# Patient Record
Sex: MALE | Race: BLACK OR AFRICAN AMERICAN | Employment: UNEMPLOYED | URBAN - METROPOLITAN AREA
[De-identification: names, ages, dates, MRNs, and addresses within clinical notes are randomized per-mention and may not be internally consistent; named-entity substitution may affect disease eponyms.]

---
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
## 2024-05-18 ENCOUNTER — HOSPITAL ENCOUNTER (EMERGENCY)
Facility: HOSPITAL | Age: 19
Discharge: HOME | End: 2024-05-18
Payer: COMMERCIAL

## 2024-05-18 VITALS
HEART RATE: 59 BPM | WEIGHT: 152.2 LBS | TEMPERATURE: 98.3 F | OXYGEN SATURATION: 98 % | RESPIRATION RATE: 19 BRPM | HEIGHT: 70 IN | DIASTOLIC BLOOD PRESSURE: 75 MMHG | BODY MASS INDEX: 21.79 KG/M2 | SYSTOLIC BLOOD PRESSURE: 112 MMHG

## 2024-05-18 DIAGNOSIS — Z20.2 POSSIBLE EXPOSURE TO STD: Primary | ICD-10-CM

## 2024-05-18 LAB
APPEARANCE UR: ABNORMAL
BACTERIA URNS QL MICRO: NEGATIVE /HPF
BILIRUB UR QL: NEGATIVE
COLOR UR: ABNORMAL
EPITH CASTS URNS QL MICRO: ABNORMAL /LPF
GLUCOSE UR STRIP.AUTO-MCNC: NEGATIVE MG/DL
HGB UR QL STRIP: ABNORMAL
KETONES UR QL STRIP.AUTO: ABNORMAL MG/DL
LEUKOCYTE ESTERASE UR QL STRIP.AUTO: ABNORMAL
NITRITE UR QL STRIP.AUTO: NEGATIVE
PH UR STRIP: 6 (ref 5–8)
PROT UR STRIP-MCNC: 30 MG/DL
RBC #/AREA URNS HPF: ABNORMAL /HPF (ref 0–5)
SP GR UR REFRACTOMETRY: 1.02
URINE CULTURE IF INDICATED: ABNORMAL
UROBILINOGEN UR QL STRIP.AUTO: 4 EU/DL (ref 0.2–1)
WBC URNS QL MICRO: ABNORMAL /HPF (ref 0–4)

## 2024-05-18 PROCEDURE — 87086 URINE CULTURE/COLONY COUNT: CPT

## 2024-05-18 PROCEDURE — 96372 THER/PROPH/DIAG INJ SC/IM: CPT

## 2024-05-18 PROCEDURE — 99284 EMERGENCY DEPT VISIT MOD MDM: CPT

## 2024-05-18 PROCEDURE — 81001 URINALYSIS AUTO W/SCOPE: CPT

## 2024-05-18 PROCEDURE — 6370000000 HC RX 637 (ALT 250 FOR IP): Performed by: NURSE PRACTITIONER

## 2024-05-18 PROCEDURE — 87491 CHLMYD TRACH DNA AMP PROBE: CPT

## 2024-05-18 PROCEDURE — 6360000002 HC RX W HCPCS: Performed by: NURSE PRACTITIONER

## 2024-05-18 PROCEDURE — 2500000003 HC RX 250 WO HCPCS: Performed by: NURSE PRACTITIONER

## 2024-05-18 PROCEDURE — 87591 N.GONORRHOEAE DNA AMP PROB: CPT

## 2024-05-18 RX ORDER — AZITHROMYCIN 500 MG/1
1000 TABLET, FILM COATED ORAL
Status: COMPLETED | OUTPATIENT
Start: 2024-05-18 | End: 2024-05-18

## 2024-05-18 RX ADMIN — AZITHROMYCIN 1000 MG: 500 TABLET, FILM COATED ORAL at 13:12

## 2024-05-18 RX ADMIN — LIDOCAINE HYDROCHLORIDE 500 MG: 10 INJECTION, SOLUTION EPIDURAL; INFILTRATION; INTRACAUDAL; PERINEURAL at 13:11

## 2024-05-18 ASSESSMENT — ENCOUNTER SYMPTOMS
ABDOMINAL PAIN: 0
BACK PAIN: 0
SHORTNESS OF BREATH: 0

## 2024-05-18 ASSESSMENT — PAIN - FUNCTIONAL ASSESSMENT: PAIN_FUNCTIONAL_ASSESSMENT: NONE - DENIES PAIN

## 2024-05-18 NOTE — ED PROVIDER NOTES
EKG: When ordered, EKG's are interpreted by the Emergency Department Physician in the absence of a cardiologist.  Please see their note for interpretation of EKG.      RADIOLOGY:  Non-plain film images such as CT, Ultrasound and MRI are read by the radiologist. Plain radiographic images are visualized and preliminarily interpreted by the ED Provider with the below findings:          Interpretation per the Radiologist below, if available at the time of this note:     No orders to display        PROCEDURES   Unless otherwise noted below, none  Procedures     CRITICAL CARE TIME       EMERGENCY DEPARTMENT COURSE and DIFFERENTIAL DIAGNOSIS/MDM   Vitals:    Vitals:    05/18/24 1129   BP: 112/75   Pulse: 59   Resp: 19   Temp: 98.3 °F (36.8 °C)   TempSrc: Oral   SpO2: 98%   Weight: 69 kg (152 lb 3.2 oz)   Height: 1.778 m (5' 10\")        Patient was given the following medications:  Medications   cefTRIAXone (ROCEPHIN) 500 mg in lidocaine 1 % 1.43 mL IM Injection (500 mg IntraMUSCular Given 5/18/24 1311)   azithromycin (ZITHROMAX) tablet 1,000 mg (1,000 mg Oral Given 5/18/24 1312)       CONSULTS: (Who and What was discussed)  None    Chronic Conditions: none    ADDITIONAL CONSIDERATIONS:  None    RECORDS REVIEWED: Nursing Notes    CC/HPI Summary, DDx, ED Course, and Reassessment: 3-year-old male presents for testing is here for STD.  Denies dysuria or penile discharge.  States he is just concerned he may have been exposed.  History of STD.  Will order GC culture urinalysis treat with Rocephin and azithromycin.  Advised he will receive a letter if results are positive         Disposition Considerations (Tests not done, Shared Decision Making, Pt Expectation of Test or Tx.):      FINAL IMPRESSION     1. Possible exposure to STD          DISPOSITION/PLAN   DISPOSITION Decision To Discharge 05/18/2024 01:12:16 PM     PATIENT REFERRED TO:  Alma Chavarria MD  [address] [facility] [address] [phone]    In 1  week         DISCHARGE MEDICATIONS:     Medication List      You have not been prescribed any medications.           DISCONTINUED MEDICATIONS:  There are no discharge medications for this patient.      I have seen and evaluated the patient autonomously. My supervision physician was on site and available for consultation if needed.     I am the Primary Clinician of Record.   MADAN Main - NP (electronically signed)    (Please note that parts of this dictation were completed with voice recognition software. Quite often unanticipated grammatical, syntax, homophones, and other interpretive errors are inadvertently transcribed by the computer software. Please disregards these errors. Please excuse any errors that have escaped final proofreading.)         Rashida Hopson APRN - TOM  05/18/24 1521       Rashida Hopson APRN - NP  05/18/24 154

## 2024-05-19 LAB
BACTERIA SPEC CULT: NORMAL
C TRACH DNA SPEC QL NAA+PROBE: NEGATIVE
N GONORRHOEA DNA SPEC QL NAA+PROBE: POSITIVE
SAMPLE TYPE: ABNORMAL
SERVICE CMNT-IMP: ABNORMAL
SERVICE CMNT-IMP: NORMAL
SPECIMEN SOURCE: ABNORMAL